# Patient Record
Sex: FEMALE | Race: AMERICAN INDIAN OR ALASKA NATIVE
[De-identification: names, ages, dates, MRNs, and addresses within clinical notes are randomized per-mention and may not be internally consistent; named-entity substitution may affect disease eponyms.]

---

## 2017-04-27 ENCOUNTER — HOSPITAL ENCOUNTER (EMERGENCY)
Dept: HOSPITAL 14 - H.ER | Age: 62
Discharge: HOME | End: 2017-04-27
Payer: MEDICARE

## 2017-04-27 VITALS — HEART RATE: 70 BPM | SYSTOLIC BLOOD PRESSURE: 149 MMHG | DIASTOLIC BLOOD PRESSURE: 89 MMHG | TEMPERATURE: 98.4 F

## 2017-04-27 VITALS — BODY MASS INDEX: 37.2 KG/M2

## 2017-04-27 VITALS — RESPIRATION RATE: 16 BRPM

## 2017-04-27 DIAGNOSIS — M25.511: ICD-10-CM

## 2017-04-27 DIAGNOSIS — R51: Primary | ICD-10-CM
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
## 2017-04-27 DIAGNOSIS — R07.82: ICD-10-CM

## 2017-04-27 DIAGNOSIS — M54.2: ICD-10-CM

## 2017-04-27 DIAGNOSIS — M54.5: ICD-10-CM
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
## 2017-04-29 VITALS — OXYGEN SATURATION: 98 %

## 2017-08-23 ENCOUNTER — HOSPITAL ENCOUNTER (EMERGENCY)
Dept: HOSPITAL 14 - H.ER | Age: 62
Discharge: HOME | End: 2017-08-23
Payer: MEDICARE

## 2017-08-23 VITALS
TEMPERATURE: 99 F | DIASTOLIC BLOOD PRESSURE: 98 MMHG | OXYGEN SATURATION: 100 % | HEART RATE: 84 BPM | RESPIRATION RATE: 18 BRPM | SYSTOLIC BLOOD PRESSURE: 156 MMHG

## 2017-08-23 VITALS — BODY MASS INDEX: 37.2 KG/M2

## 2017-08-23 DIAGNOSIS — M54.30: Primary | ICD-10-CM

## 2017-08-23 PROCEDURE — 96372 THER/PROPH/DIAG INJ SC/IM: CPT

## 2017-08-23 PROCEDURE — 99283 EMERGENCY DEPT VISIT LOW MDM: CPT

## 2017-08-23 NOTE — ED PDOC
Lower Extremity Pain/Injury


Time Seen by Provider: 08/23/17 20:28


Chief Complaint (Nursing): Lower Extremity Problem/Injury


Chief Complaint (Provider): Left glute pain


History Per: Patient


History/Exam Limitations: no limitations


Onset/Duration Of Symptoms: Days (x 2)


Current Symptoms Are (Timing): Still Present


Additional Complaint(s): 





Lucia is a 61 y/o female who presents to the ED complaining of left glute pain

, ongoing for 2 days. Pain is non-radiating and impedes her ability to walk. No 

further medical complaints.





PMD: Gretchen Mckeon





Past Medical History


Reviewed: Historical Data, Nursing Documentation, Vital Signs


Vital Signs: 





 Last Vital Signs











Temp  99 F   08/23/17 20:20


 


Pulse  84   08/23/17 20:20


 


Resp  18   08/23/17 20:20


 


BP  156/98 H  08/23/17 20:20


 


Pulse Ox  100   08/23/17 20:20














- Medical History


PMH: Diabetes, HTN, Hypercholesterolemia





- Surgical History


Surgical History: Cholecystectomy





- Family History


Family History: States: Unknown Family Hx





- Social History


Current smoker - smoking cessation education provided: No


Alcohol: None


Drugs: Denies





- Home Medications


Home Medications: 


 Ambulatory Orders











 Medication  Instructions  Recorded


 


Aspirin [Aspirin Adult Low 81 mg PO DAILY 02/19/14





Strength]  


 


Atorvastatin [Lipitor] 20 mg PO DAILY 02/19/14


 


Brimonidine Tartrate/Timolol 5 ml OU BID 02/19/14





[Combigan 0.2%-0.5% 5 ml]  


 


Dorzolamide Hydrochloride 10 ml OU DAILY 02/19/14





[Dorzolamide Hydrochloride 10 ml]  


 


Hydrochlorothiazide 25 mg PO DAILY 02/19/14


 


Metoprolol Succinate 100 mg PO DAILY 02/19/14


 


Lidocaine 2% Viscous 10 ml MM Q4H PRN #1 bottle 04/01/16


 


Naproxen 500 mg PO Q12H PRN #20 ect 08/23/17














- Allergies


Allergies/Adverse Reactions: 


 Allergies











Allergy/AdvReac Type Severity Reaction Status Date / Time


 


No Known Allergies Allergy   Verified 02/19/14 10:41














Review of Systems


ROS Statement: Except As Marked, All Systems Reviewed And Found Negative


Musculoskeletal: Positive for: Other (Left glute pain)





Physical Exam





- Reviewed


Nursing Documentation Reviewed: Yes


Vital Signs Reviewed: Yes





- Physical Exam


Appears: Positive for: Non-toxic, No Acute Distress


Head Exam: Positive for: ATRAUMATIC, NORMAL INSPECTION, NORMOCEPHALIC


Skin: Positive for: Normal Color (No abrasion, ecchymosis or lesions in area of 

pain), Warm, Dry


Eye Exam: Positive for: Normal appearance


ENT: Positive for: Normal ENT Inspection


Neck: Positive for: Normal, Painless ROM


Respiratory: Negative for: Accessory Muscle Use, Respiratory Distress


Extremity: Positive for: Normal ROM, Tenderness (To the left buttock), 

Capillary Refill (< 2 sec)


Neurologic/Psych: Positive for: Alert, Oriented.  Negative for: Motor/Sensory 

Deficits





- ECG


O2 Sat by Pulse Oximetry: 100 (RA)


Pulse Ox Interpretation: Normal





Medical Decision Making


Medical Decision Making: 





Time: 20:51


Initial Impression: Sciatica 


Initial Plan: 


--Toradol 30 mg IM 


Flexeril


--Pending reevaluation 





Pt reports feeing better on re-evaluation. Pt has flexeril 5mg at home. 


--------------------------------------------------------------------------------

-----------------


Scribe Attestation:   


Documented by Sona Arango, acting as a scribe for Lisa Chew PA-C





Provider Scribe Attestation:


All medical record entries made by the Scribe were at my direction and 

personally dictated by me. I have reviewed the chart and agree that the record 

accurately reflects my personal performance of the history, physical exam, 

medical decision making, and the department course for this patient. I have 

also personally directed, reviewed, and agree with the discharge instructions 

and disposition.





Disposition





- Clinical Impression


Clinical Impression: 


 Sciatica








- Patient ED Disposition


Is Patient to be Admitted: No





- Disposition


Disposition: Routine/Home


Disposition Time: 22:40


Condition: GOOD


Prescriptions: 


Naproxen 500 mg PO Q12H PRN #20 ect


 PRN Reason: pain, inflammation


Instructions:  Sciatica (ED)

## 2018-02-15 ENCOUNTER — HOSPITAL ENCOUNTER (OUTPATIENT)
Dept: HOSPITAL 42 - SDS | Age: 63
Discharge: HOME | End: 2018-02-15
Attending: RADIOLOGY
Payer: MEDICARE

## 2018-02-15 VITALS — HEART RATE: 67 BPM | SYSTOLIC BLOOD PRESSURE: 130 MMHG | DIASTOLIC BLOOD PRESSURE: 64 MMHG | OXYGEN SATURATION: 96 %

## 2018-02-15 VITALS — TEMPERATURE: 98 F | RESPIRATION RATE: 18 BRPM

## 2018-02-15 VITALS — BODY MASS INDEX: 26.5 KG/M2

## 2018-02-15 DIAGNOSIS — I10: ICD-10-CM

## 2018-02-15 DIAGNOSIS — E04.2: Primary | ICD-10-CM

## 2018-02-15 LAB
APTT BLD: 27.5 SECONDS (ref 25.1–36.5)
BASOPHILS # BLD AUTO: 0.02 K/MM3 (ref 0–2)
BASOPHILS NFR BLD: 0.3 % (ref 0–3)
BUN SERPL-MCNC: 13 MG/DL (ref 7–21)
CALCIUM SERPL-MCNC: 9.6 MG/DL (ref 8.4–10.5)
EOSINOPHIL # BLD: 0.1 10*3/UL (ref 0–0.7)
EOSINOPHIL NFR BLD: 1.8 % (ref 1.5–5)
ERYTHROCYTE [DISTWIDTH] IN BLOOD BY AUTOMATED COUNT: 14.2 % (ref 11.5–14.5)
GFR NON-AFRICAN AMERICAN: > 60
GRANULOCYTES # BLD: 4.66 10*3/UL (ref 1.4–6.5)
GRANULOCYTES NFR BLD: 63.9 % (ref 50–68)
HGB BLD-MCNC: 11.8 G/DL (ref 12–16)
INR PPP: 0.97 (ref 0.93–1.08)
LYMPHOCYTES # BLD: 2 10*3/UL (ref 1.2–3.4)
LYMPHOCYTES NFR BLD AUTO: 27 % (ref 22–35)
MCH RBC QN AUTO: 28.8 PG (ref 25–35)
MCHC RBC AUTO-ENTMCNC: 31.7 G/DL (ref 31–37)
MCV RBC AUTO: 90.7 FL (ref 80–105)
MONOCYTES # BLD AUTO: 0.5 10*3/UL (ref 0.1–0.6)
MONOCYTES NFR BLD: 7 % (ref 1–6)
PLATELET # BLD: 193 10^3/UL (ref 120–450)
PMV BLD AUTO: 14.3 FL (ref 7–11)
PROTHROMBIN TIME: 11.1 SECONDS (ref 9.4–12.5)
RBC # BLD AUTO: 4.1 10^6/UL (ref 3.5–6.1)
WBC # BLD AUTO: 7.3 10^3/UL (ref 4.5–11)

## 2018-02-15 PROCEDURE — 10022: CPT

## 2018-02-15 PROCEDURE — 80048 BASIC METABOLIC PNL TOTAL CA: CPT

## 2018-02-15 PROCEDURE — 85730 THROMBOPLASTIN TIME PARTIAL: CPT

## 2018-02-15 PROCEDURE — 88305 TISSUE EXAM BY PATHOLOGIST: CPT

## 2018-02-15 PROCEDURE — 36415 COLL VENOUS BLD VENIPUNCTURE: CPT

## 2018-02-15 PROCEDURE — 85610 PROTHROMBIN TIME: CPT

## 2018-02-15 PROCEDURE — 85025 COMPLETE CBC W/AUTO DIFF WBC: CPT

## 2018-02-15 PROCEDURE — 88173 CYTOPATH EVAL FNA REPORT: CPT

## 2018-02-15 NOTE — US
PROCEDURE:  Ultrasound-guided right thyroid fine needle aspiration 

biopsy.



CLINICAL HISTORY:  Multiple small thyroid nodules. 2.4 cm dominant 

right nodule. Evaluate for malignancy 



PHYSICIAN(S):  Jose Bermeo M.D.



TECHNIQUE:

The relative risks and indications for the procedure were explained 

to the patient and consent obtained. The patient was placed supine on 

the stretcher with the neck extended and preliminary sonography of 

the thyroid performed. This reveal a 2.4 cm well-circumscribed 

hypoechoic nodule in the lower aspect of the right thyroid. Smaller 

nodules are noted bilaterally 



The neck was prepped and draped in the usual sterile fashion. 

Conscious sedation and monitoring were provided throughout the 

procedure by a nurse. 1% Xylocaine was used to anesthetize the skin 

and soft tissues at the access site. Three passes with a 22-gauge 

needle were performed under ultrasound guidance for fine needle 

aspiration of the 2.4 cm hypoechoic nodule in the right thyroid. The 

slides were reviewed by pathology and deemed adequate. The patient 

tolerated the procedure well.



IMPRESSION:

1. Ultrasound guided fine needle aspiration of a 2.4 cm 

hypoechoicnodule in the right thyroid.

## 2018-03-20 ENCOUNTER — HOSPITAL ENCOUNTER (OUTPATIENT)
Dept: HOSPITAL 14 - H.ENDO | Age: 63
Discharge: HOME | End: 2018-03-20
Attending: INTERNAL MEDICINE
Payer: MEDICARE

## 2018-03-20 VITALS
OXYGEN SATURATION: 100 % | SYSTOLIC BLOOD PRESSURE: 137 MMHG | HEART RATE: 71 BPM | DIASTOLIC BLOOD PRESSURE: 83 MMHG | RESPIRATION RATE: 20 BRPM

## 2018-03-20 VITALS — TEMPERATURE: 97 F

## 2018-03-20 VITALS — BODY MASS INDEX: 26.5 KG/M2

## 2018-03-20 DIAGNOSIS — K64.8: ICD-10-CM

## 2018-03-20 DIAGNOSIS — E78.5: ICD-10-CM

## 2018-03-20 DIAGNOSIS — Z12.11: Primary | ICD-10-CM

## 2018-03-20 DIAGNOSIS — E11.9: ICD-10-CM

## 2018-03-20 DIAGNOSIS — Z86.73: ICD-10-CM

## 2018-03-20 DIAGNOSIS — I10: ICD-10-CM

## 2018-03-20 PROCEDURE — 45378 DIAGNOSTIC COLONOSCOPY: CPT

## 2019-03-19 ENCOUNTER — HOSPITAL ENCOUNTER (OUTPATIENT)
Dept: HOSPITAL 14 - H.OPSURG | Age: 64
Discharge: HOME | End: 2019-03-19
Attending: OPHTHALMOLOGY
Payer: MEDICARE

## 2019-03-19 VITALS — DIASTOLIC BLOOD PRESSURE: 90 MMHG | TEMPERATURE: 97.5 F | HEART RATE: 84 BPM | SYSTOLIC BLOOD PRESSURE: 175 MMHG

## 2019-03-19 VITALS — BODY MASS INDEX: 35.2 KG/M2

## 2019-03-19 VITALS — OXYGEN SATURATION: 98 %

## 2019-03-19 VITALS — RESPIRATION RATE: 18 BRPM

## 2019-03-19 DIAGNOSIS — H40.9: Primary | ICD-10-CM

## 2019-03-19 DIAGNOSIS — E11.9: ICD-10-CM

## 2019-03-19 DIAGNOSIS — I10: ICD-10-CM

## 2019-03-19 DIAGNOSIS — E78.5: ICD-10-CM

## 2019-03-19 PROCEDURE — 82948 REAGENT STRIP/BLOOD GLUCOSE: CPT

## 2019-03-19 PROCEDURE — 66170 GLAUCOMA SURGERY: CPT

## 2019-03-20 NOTE — OP
PROCEDURE DATE: 03/19/



SURGEON: KY ANTOINE MD



ANESTHESIOLOGIST: REJI ONEAL MD ; LAWRENCE BARTHOLOMEW MD



ANESTHESIA: LOCAL / IV SEDATION



PREOPERATIVE DIAGNOSIS: UNCONTROLLED GLAUCOMA LEFT EYE



POSTOPERATIVE DIAGNOSIS:   SAME



OPERATION:  TRABECULECTOMY OF THE LEFT EYE.



PREPARATION AND PROCEDURE:  The patient was brought to the Operative Suite and 
after facial akinesia and retrobulbar block were induced, the patient was 
prepped and draped in the usual manner for sterile ophthalmic surgery.  A wire-
lid speculum was placed through the belly of the superior rectus muscle for 
fixation of the globe. At 6 mm posterior to the 12 o'clock position of the 
cornea, a curvilinear peritomy was performed through bulbar conjunctiva and 
Tenon's capsule for 8 degrees. The conjunctiva, Tenon's capsule and 
subconjunctival tissue were reflected anteriorly over the cornea with blunt and 
sharp dissection using a Tooke knife.  Hemostasis was obtained using bipolar 
cautery. When this was obtained satisfactorily, a 5 mm lamellar scleral triangle
was fashioned at the base of the triangle at the surgical sulcus.  A #75 medium 
Shinnecock blade was employed to delineate the size of the triangular flap.  This 
was done to one half depth of the sclera. The apex of the flap was elevated 
using a 0.12 micro-forceps and the lamellar flap was initiated with the #75 
medium Shinnecock blade. At this point, the #66 Shinnecock blade was employed to 
continue and terminate the lamellar scleral flap which was done satisfactorily 
through the surgical sulcus. With the flap being held vertically and a fresh #75
medium Shinnecock blade employed, a horizontal incision from one end to the other at
the base of the flap was fashioned through the trabecular meshwork into the 
anterior chamber. The peripheral iris was noted to prolapse satisfactorily into 
the wound and a large peripheral iridectomy was fashioned at this time. The iris
was noted to retract back into the anterior chamber satisfactorily. A section of
the trabecular meshwork and scleral spur measuring 5 mm X 5 mm was excised using
a Vannas scissor and 0.12 forceps. This was submitted to Pathology for 
examination.  A #8-0 Vicryl suture was placed at the apex of the flap and this 
was secured. The anterior chamber was reformed using balanced salt solution and 
Miochol was instilled into the anterior chamber for pupillary myosis.  The pupil
was found to constrict and to be round.  The conjunctival peritomy and Tenon's 
capsule were closed in continuous fashion using a #8-0 chromic suture. This was 
done satisfactorily and no wound leaks were noted. Twenty mg of A-metha was 
injected through the lower lid into the subtenon. The #4-0 superior rectus 
suture was removed. Maxitrol drops were applied into the eye. A patch and shield
were applied.



POSTOPERATIVE CONDITION: The patient was brought to the Post anesthesia Recovery
area with stable vital signs.



______________________

DKY JONES MD